# Patient Record
Sex: MALE | Race: WHITE
[De-identification: names, ages, dates, MRNs, and addresses within clinical notes are randomized per-mention and may not be internally consistent; named-entity substitution may affect disease eponyms.]

---

## 2017-12-16 ENCOUNTER — HOSPITAL ENCOUNTER (EMERGENCY)
Dept: HOSPITAL 42 - ED | Age: 27
Discharge: HOME | End: 2017-12-16
Payer: MEDICAID

## 2017-12-16 VITALS — SYSTOLIC BLOOD PRESSURE: 136 MMHG | TEMPERATURE: 97.6 F | DIASTOLIC BLOOD PRESSURE: 84 MMHG | RESPIRATION RATE: 20 BRPM

## 2017-12-16 VITALS — HEART RATE: 80 BPM | OXYGEN SATURATION: 100 %

## 2017-12-16 VITALS — BODY MASS INDEX: 33.2 KG/M2

## 2017-12-16 DIAGNOSIS — R05: Primary | ICD-10-CM

## 2017-12-16 DIAGNOSIS — R09.81: ICD-10-CM

## 2017-12-16 NOTE — ED PDOC
Arrival/HPI





- General


Time Seen by Provider: 12/16/17 15:45


Historian: Patient





- History of Present Illness


Narrative History of Present Illness (Text): 





12/16/17 15:56


27yr old male with hx of smoking presents today with 2 week history of cough 

with yellow sputum. c/o nasal congestion. no cp or sob. states he has had 

hoarse voice for over a year and has f/u with specialist and had scope. pt 

denies fever/chills. denies sick contacts. pt denies sore throat. denies ear 

pain. no vomiting/diarrhea. no abdominal pain. c/o rib pain with cough. no 

headache or dizziness. no other complaints. 


Time/Duration: > week (2 weeks)


Symptom Onset: Gradual





Past Medical History





- Provider Review


Nursing Documentation Reviewed: Yes





- Travel History


Have you recently traveled outside [place] w/in the past 3 mons?: No





- Infectious Disease


Hx of Infectious Diseases: None





- Tetanus Immunization


Tetanus Immunization: Unknown





- Neurological


Hx Seizures: Yes





- Psychiatric


Hx Depression: No


Hx Emotional Abuse: No


Hx Physical Abuse: No


Hx Substance Use: No





- Suicidal Assessment


Feels Threatened In Home Enviroment: No





Family/Social History





- Physician Review


Nursing Documentation Reviewed: Yes


Family/Social History: Unknown Family HX


Smoking Status: Heavy Smoker > 10 Cigarettes Daily


Hx Alcohol Use: No


Hx Substance Use: No


Hx Substance Use Treatment: No





Allergies/Home Meds


Allergies/Adverse Reactions: 


Allergies





No Known Allergies Allergy (Verified 03/12/13 17:30)


 











Review of Systems





- Review of Systems


Constitutional: absent: Fatigue, Fevers


ENT: Sinus Congestion.  absent: Sore Throat


Respiratory: Cough, Sputum.  absent: SOB


Cardiovascular: absent: Chest Pain, Palpitations


Gastrointestinal: absent: Abdominal Pain, Nausea, Vomiting


Genitourinary Male: absent: Dysuria


Musculoskeletal: absent: Arthralgias


Skin: absent: Rash, Pruritis


Neurological: absent: Headache, Dizziness


Psychiatric: absent: Anxiety, Depression, Suicidal Ideation





Physical Exam


Vital Signs Reviewed: Yes


Vital Signs











  Temp Pulse Resp BP Pulse Ox


 


 12/16/17 15:42  97.6 F  83  20  136/84  97











Temperature: Afebrile


Blood Pressure: Normal


Pulse: Regular


Respiratory Rate: Normal


Appearance: Positive for: Well-Appearing, Non-Toxic, Comfortable


Pain Distress: None


Mental Status: Positive for: Alert and Oriented X 3





- Systems Exam


Head: Present: Atraumatic


Conjunctiva: Present: Normal


Ears: Present: Normal, NORMAL TM


Mouth: Present: Moist Mucous Membranes


Pharnyx: Present: Normal, Other (hoarse voice (chronic per patient)).  No: 

ERYTHEMA, EXUDATE, TONSILS ENLARGED, Peritonsilar Swelling, Uvular Deviation


Nose (External): Present: Atraumatic


Nose (Internal): Present: Normal Inspection


Neck: Present: Normal Range of Motion, Trachea Midline.  No: Lymphadenopathy


Respiratory/Chest: Present: Clear to Auscultation, Good Air Exchange.  No: 

Respiratory Distress, Accessory Muscle Use, Wheezes, Decreased Breath Sounds, 

Retracting, Rhonchi, Tachypneic


Cardiovascular: Present: Regular Rate and Rhythm


Abdomen: No: Tenderness, Distention, Rebound, Guarding


Back: Present: Normal Inspection


Neurological: Present: GCS=15, Speech Normal


Skin: Present: Warm, Dry


Psychiatric: Present: Alert, Oriented x 3





Medical Decision Making


ED Course and Treatment: 





12/16/17 15:59


Patient is nontoxic well-appearing in no distress. Vital signs are stable. 





cxr; FINDINGS:


LUNGS:


No active pulmonary disease.


PLEURA:


No significant pleural effusion identified. No pneumothorax apparent.


CARDIOVASCULAR:


Normal.


OSSEOUS STRUCTURES:


No significant abnormalities.


VISUALIZED UPPER ABDOMEN:


Normal.


OTHER FINDINGS:


None.


IMPRESSION:


No active disease.








Zithromax





pt reassessment; pt non toxic well appearing; no distress. stable vitals. 





I advised follow up with primary care physician within the next 2 days. I 

advised increase fluids and return if symptoms worsen persist or if new 

symptoms develop.








IMPRESSION; cough


Motrin one tablet every 6 hours as needed for pain


Zithromax one tablet once daily x4 days


FLonase; 2 sprays each nostril once daily. 


Increase fluids


Followup with primary care physician the next 2 days


Follow up with the ENT specialist within the next 2 days. 


Return if symptoms worsen persist or if new symptoms develop











- RAD Interpretation


Radiology Orders: 








12/16/17 15:55


CHEST TWO VIEWS (PA/LAT) [RAD] Stat 














Disposition/Present on Arrival





- Present on Arrival


Any Indicators Present on Arrival: No


History of DVT/PE: No


History of Uncontrolled Diabetes: No


Urinary Catheter: No


History of Decub. Ulcer: No


History Surgical Site Infection Following: None





- Disposition


Have Diagnosis and Disposition been Completed?: Yes


Diagnosis: 


 Cough, Nasal congestion





Disposition: HOME/ ROUTINE


Disposition Time: 16:31


Patient Plan: Discharge


Patient Problems: 


 Current Active Problems











Problem Status Onset


 


Cough Acute  


 


Nasal congestion Acute  











Condition: GOOD


Discharge Instructions (ExitCare):  Acute Cough (ED)


Additional Instructions: 


Motrin every 6 hours as needed for pain


Zithromax one tablet once daily x4 days


FLonase; 2 sprays each nostril once daily. 


Increase fluids


Followup with primary care physician the next 2 days


Follow up with the ENT specialist within the next 2 days. 


Return if symptoms worsen persist or if new symptoms develop


Prescriptions: 


Azithromycin [Zithromax] 250 mg PO DAILY #4 tab


Fluticasone Nasal [Flonase] 2 spr NS DAILY #1 spr


Referrals: 


Isidro Rivera MD [Primary Care Provider] - Follow up with primary


Abe Kim DO [Staff Provider] - Follow up with primary


Forms:  WORK NOTE

## 2017-12-16 NOTE — RAD
HISTORY:

cough x 2 weeks  



COMPARISON:

No prior.



TECHNIQUE:

Chest PA and lateral



FINDINGS:



LUNGS:

No active pulmonary disease.



PLEURA:

No significant pleural effusion identified. No pneumothorax apparent.



CARDIOVASCULAR:

Normal.



OSSEOUS STRUCTURES:

No significant abnormalities.



VISUALIZED UPPER ABDOMEN:

Normal.



OTHER FINDINGS:

None.



IMPRESSION:

No active disease.

## 2018-02-10 ENCOUNTER — HOSPITAL ENCOUNTER (EMERGENCY)
Dept: HOSPITAL 42 - ED | Age: 28
Discharge: HOME | End: 2018-02-10
Payer: MEDICAID

## 2018-02-10 VITALS — BODY MASS INDEX: 33.2 KG/M2

## 2018-02-10 DIAGNOSIS — H60.92: Primary | ICD-10-CM

## 2018-02-10 NOTE — ED PDOC
Arrival/HPI





- General


Time Seen by Provider: 02/10/18 17:19


Historian: Patient





- History of Present Illness


Narrative History of Present Illness (Text): 





02/10/18 17:47


26yo male with no PMhx present with 2weeks history of left ear pain. States 

pain started after cleaning his ear two weeks ago. He did not take any 

medication for the pain. Denies drainage from the ear, hearing loss, any other 

complaint.





Past Medical History





- Provider Review


Nursing Documentation Reviewed: Yes





- Infectious Disease


Hx of Infectious Diseases: None





- Tetanus Immunization


Tetanus Immunization: Unknown





- Neurological


Hx Seizures: Yes





- Psychiatric


Hx Depression: No


Hx Emotional Abuse: No


Hx Physical Abuse: No


Hx Substance Use: No





- Anesthesia


Hx Anesthesia: No


Hx Anesthesia Reactions: No


Hx Malignant Hyperthermia: No





- Suicidal Assessment


Feels Threatened In Home Enviroment: No





Family/Social History





- Physician Review


Nursing Documentation Reviewed: Yes


Family/Social History: Unknown Family HX


Smoking Status: Heavy Smoker > 10 Cigarettes Daily


Hx Alcohol Use: No


Hx Substance Use: No


Hx Substance Use Treatment: No





Allergies/Home Meds


Allergies/Adverse Reactions: 


Allergies





No Known Allergies Allergy (Verified 03/12/13 17:30)


 











Review of Systems





- Physician Review


All systems were reviewed & negative as marked: Yes





- Review of Systems


Constitutional: Normal


Eyes: Normal


ENT: Other (LEft ear pain)


Respiratory: Normal


Cardiovascular: Normal


Gastrointestinal: Normal


Genitourinary Male: Normal


Musculoskeletal: Normal


Skin: Normal


Neurological: Normal


Endocrine: Normal


Hemo/Lymphatic: Normal


Psychiatric: Normal





Physical Exam


Vital Signs Reviewed: Yes


Temperature: Afebrile


Blood Pressure: Normal


Pulse: Regular


Respiratory Rate: Normal


Appearance: Positive for: Well-Appearing, Non-Toxic, Comfortable


Pain Distress: None


Mental Status: Positive for: Alert and Oriented X 3





- Systems Exam


Head: Present: Atraumatic, Normocephalic


Pupils: Present: PERRL


Extroacular Muscles: Present: EOMI


Conjunctiva: Present: Normal


Ears: Present: NORMAL TM, Normal Canal, Other (Tenderness posterior to left ear 

and pain with manipulation of left pinna).  No: Erythema, TM Bulging, Fluid, TM 

Perf


Mouth: Present: Moist Mucous Membranes


Neck: Present: Normal Range of Motion


Respiratory/Chest: Present: Clear to Auscultation, Good Air Exchange.  No: 

Respiratory Distress, Accessory Muscle Use


Cardiovascular: Present: Regular Rate and Rhythm, Normal S1, S2.  No: Murmurs


Abdomen: Present: Normal Bowel Sounds.  No: Tenderness, Distention, Peritoneal 

Signs


Back: Present: Normal Inspection


Upper Extremity: Present: Normal Inspection.  No: Cyanosis, Edema


Lower Extremity: Present: Normal Inspection.  No: Edema


Neurological: Present: GCS=15, CN II-XII Intact, Speech Normal


Skin: Present: Warm, Dry, Normal Color.  No: Rashes


Psychiatric: Present: Alert, Oriented x 3, Normal Insight, Normal Concentration





Medical Decision Making





- Medication Orders


Current Medication Orders: 














Discontinued Medications





Ciprofloxacin/Dexamethasone (Ciprodex Otic)  4 drop AD ONCE STA


   Stop: 02/10/18 17:20


Ibuprofen (Motrin Tab)  600 mg PO STAT STA


   Stop: 02/10/18 17:21











Disposition/Present on Arrival





- Present on Arrival


Any Indicators Present on Arrival: No


History of DVT/PE: No


History of Uncontrolled Diabetes: No


Urinary Catheter: No


History Surgical Site Infection Following: None





- Disposition


Have Diagnosis and Disposition been Completed?: Yes


Diagnosis: 


 Acute otitis externa





Disposition: HOME/ ROUTINE


Disposition Time: 17:55


Patient Plan: Discharge


Condition: STABLE


Discharge Instructions (ExitCare):  Otitis Externa (ED)


Additional Instructions: 


Follow up with your doctor/ENT


Return to ED for any new or worsening symptoms


Prescriptions: 


Ciprofloxacin/Dexamethasone [Ciprodex 0.3%-0.1% 7.5 Ml] 7.5 ml OT BID #1 bottle


Ibuprofen [Motrin Tab] 600 mg PO Q6 #20 tab


Referrals: 


Kirk Esposito DO [[facility]] - Follow up with primary

## 2019-04-27 ENCOUNTER — HOSPITAL ENCOUNTER (EMERGENCY)
Dept: HOSPITAL 42 - ED | Age: 29
Discharge: HOME | End: 2019-04-27
Payer: SELF-PAY

## 2019-04-27 VITALS — SYSTOLIC BLOOD PRESSURE: 106 MMHG | TEMPERATURE: 97.9 F | DIASTOLIC BLOOD PRESSURE: 69 MMHG | HEART RATE: 75 BPM

## 2019-04-27 VITALS — OXYGEN SATURATION: 99 % | RESPIRATION RATE: 18 BRPM

## 2019-04-27 VITALS — BODY MASS INDEX: 33.2 KG/M2

## 2019-04-27 DIAGNOSIS — M54.5: Primary | ICD-10-CM

## 2019-04-27 PROCEDURE — 99283 EMERGENCY DEPT VISIT LOW MDM: CPT

## 2019-04-27 PROCEDURE — 96372 THER/PROPH/DIAG INJ SC/IM: CPT

## 2019-04-27 PROCEDURE — 72110 X-RAY EXAM L-2 SPINE 4/>VWS: CPT

## 2019-04-27 NOTE — ED PDOC
Arrival/HPI





- General


Historian: Patient





- History of Present Illness


Narrative History of Present Illness (Text): 





04/27/19 12:32


28-year-old male presents today with a one-week history of left lower back pain 

radiating into the buttocks and left leg.  Patient denies any recent trauma or 

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  

Patient states the pain is sharp and achy located in the left lower back and 

radiates into the left leg.  Patient denies numbness weakness or tingling in the

extremity.  He denies saddle paresthesias.  He denies bladder or bowel 

incontinence.  No abdominal pain.  No nausea vomiting diarrhea or constipation. 

No chest pain or shortness of breath.  Patient states he is been taking Motrin 

and Tylenol without improvement in his symptoms.  Patient states symptoms worsen

when standing for long period of time or lifting his leg. 





<Sarah Benitez - Last Filed: 04/27/19 16:57>





<Amaury Hobbs - Last Filed: 04/27/19 17:01>





- General


Chief Complaint: Back Pain


Time Seen by Provider: 04/27/19 12:10





Past Medical History





- Provider Review


Nursing Documentation Reviewed: Yes





- Travel History


Have you recently traveled outside [place] w/in the past 3 mons?: No





- Infectious Disease


Hx of Infectious Diseases: None





- Tetanus Immunization


Tetanus Immunization: Unknown





- Neurological


Hx Seizures: Yes





- Psychiatric


Hx Depression: No


Hx Emotional Abuse: No


Hx Physical Abuse: No


Hx Substance Use: No





- Anesthesia


Hx Anesthesia: No


Hx Anesthesia Reactions: No


Hx Malignant Hyperthermia: No





- Suicidal Assessment


Feels Threatened In Home Enviroment: No





<Sarah Benitez - Last Filed: 04/27/19 16:57>





Family/Social History





- Physician Review


Nursing Documentation Reviewed: Yes


Family/Social History: Unknown Family HX


Smoking Status: Heavy Smoker > 10 Cigarettes Daily


Hx Alcohol Use: No


Hx Substance Use: No


Hx Substance Use Treatment: No





<Sarah Benitez - Last Filed: 04/27/19 16:57>





Allergies/Home Meds





<Sarah Benitez - Last Filed: 04/27/19 16:57>





<Amaury Hobbs - Last Filed: 04/27/19 17:01>


Allergies/Adverse Reactions: 


Allergies





No Known Allergies Allergy (Verified 02/10/18 17:55)


   











Review of Systems





- Review of Systems


Constitutional: absent: Fatigue, Fevers


Respiratory: absent: SOB, Cough


Cardiovascular: absent: Chest Pain, Palpitations


Gastrointestinal: absent: Abdominal Pain, Nausea, Vomiting


Musculoskeletal: Arthralgias, Back Pain.  absent: Neck Pain


Skin: absent: Rash, Pruritis


Neurological: absent: Headache, Dizziness


Psychiatric: absent: Anxiety, Depression





<Sarah Benitez T - Last Filed: 04/27/19 16:57>





Physical Exam


Vital Signs Reviewed: Yes





Vital Signs











  Temp Pulse Resp BP Pulse Ox


 


 04/27/19 12:03  97.7 F  88  18  121/67  99


 


 04/27/19 11:55  97.7 F  88  18  121/67  99











Temperature: Afebrile


Blood Pressure: Normal


Pulse: Regular


Respiratory Rate: Normal


Appearance: Positive for: Well-Appearing, Non-Toxic, Comfortable


Pain Distress: None


Mental Status: Positive for: Alert and Oriented X 3





- Systems Exam


Head: Present: Atraumatic


Neck: Present: Normal Range of Motion


Respiratory/Chest: Present: Clear to Auscultation, Good Air Exchange.  No: 

Respiratory Distress, Accessory Muscle Use


Cardiovascular: Present: Regular Rate and Rhythm, Normal S1, S2.  No: Murmurs


Abdomen: No: Tenderness, Distention, Rebound, Guarding


Back: Present: Normal Inspection, Paraspinal Tenderness (+ left sided paraspinal

tenderness, + left sided sciatic foramen tenderness), Pain with Leg Raise (+ 

straight leg raise on the left.).  No: CVA Tenderness, Midline Tenderness


Upper Extremity: Present: Normal Inspection


Lower Extremity: Present: Normal Inspection, Normal ROM


Neurological: Present: GCS=15, Motor Func Grossly Intact, Normal Sensory 

Function, Gait Normal


Skin: Present: Warm, Dry, Normal Color.  No: Rashes


Psychiatric: Present: Alert, Oriented x 3





<Sarah Benitez T - Last Filed: 04/27/19 16:57>





Vital Signs











  Temp Pulse Resp BP Pulse Ox


 


 04/27/19 13:42  97.9 F  75  18  106/69  99


 


 04/27/19 12:03  97.7 F  88  18  121/67  99


 


 04/27/19 11:55  97.7 F  88  18  121/67  99














<Amaury Hobbs - Last Filed: 04/27/19 17:01>





Medical Decision Making


ED Course and Treatment: 





04/27/19 13:22


Patient nontoxic well-appearing in no distress with stable vital signs. 











Toradol, Flexeril, x-ray of the lumbar spine: No fracture





Patient reassessment: Feeling better with medications ambulating with a steady 

gait. Muscle strength 5 out of 5 bilaterally.








I advised to followup with the orthopedist/back specialist within the next 2 

days. Return if symptoms worsen persist or new symptoms develop. 





Patient verbalizes understanding of discharge instructions and need for 

immediate followup.








All aspects of this case were discussed the attending of record.








Impression: Back pain 


Motrin every 6 hours as needed for pain


Flexeril one tablet every 8 hours as needed for muscle spasms: May cause 

drowsiness


Followup with the orthopedist/back specialist within the next 2 days 


Followup with primary care physician within the next 2 days


Return if symptoms worsen persist or if new symptoms develop





Reassessment Condition: Re-examined, Improved





- RAD Interpretation


Radiology Orders: 











04/27/19 12:28


LS SPINE WITH OBL > 18 YRS OLD [RAD] Stat 














- Medication Orders


Current Medication Orders: 














Discontinued Medications





Cyclobenzaprine HCl (Flexeril)  10 mg PO STAT STA


   Stop: 04/27/19 12:29


Ketorolac Tromethamine (Toradol)  60 mg IM STAT STA


   Stop: 04/27/19 12:29











<Sarah Benitez - Last Filed: 04/27/19 16:57>





- RAD Interpretation


Radiology Orders: 











04/27/19 12:28


LS SPINE WITH OBL > 18 YRS OLD [RAD] Stat 














- Medication Orders


Current Medication Orders: 














Discontinued Medications





Cyclobenzaprine HCl (Flexeril)  10 mg PO STAT STA


   Stop: 04/27/19 12:29


   Last Admin: 04/27/19 12:41  Dose: 10 mg





Ketorolac Tromethamine (Toradol)  60 mg IM STAT STA


   Stop: 04/27/19 12:29


   Last Admin: 04/27/19 12:41  Dose: 60 mg





MAR Pain Assessment


 Document     04/27/19 12:41  SRE  (Rec: 04/27/19 12:42  SRE  [id number])


     Pain Reassessment


      Is this a pain reassessment?               Yes


     Sleep


      Is patient sleeping during reassessment?   No


     Presence of Pain


      Presence of Pain                           Yes


     Pain Scale Used


     Protocol:  PSCALES


      Pain Scale Used                            Numeric


     Location


      Pain Location Body Site                    Back


     Description


      Description                                Intermittent


IM Administration Charges


 Document     04/27/19 12:41  SRE  (Rec: 04/27/19 12:42  SRE  [id number])


     Charges for Administration


      # of IM Administrations                    1














<Amaury Hobbs - Last Filed: 04/27/19 17:01>





- PA / NP / Resident Statement


MD/ has reviewed & agrees with the documentation as recorded.





<Amaury Hobbs - Last Filed: 04/27/19 17:01>





Disposition/Present on Arrival





- Present on Arrival


Any Indicators Present on Arrival: No


History of DVT/PE: No


History of Uncontrolled Diabetes: No


Urinary Catheter: No


History of Decub. Ulcer: No


History Surgical Site Infection Following: None





- Disposition


Have Diagnosis and Disposition been Completed?: Yes


Disposition Time: 13:23


Patient Plan: Discharge





<Sarah Benitez - Last Filed: 04/27/19 16:57>





<Amaury Hobbs - Last Filed: 04/27/19 17:01>





- Disposition


Diagnosis: 


 Back pain





Disposition: HOME/ ROUTINE


Condition: GOOD


Discharge Instructions (ExitCare):  Low Back Pain  (DC)


Additional Instructions: 


Motrin every 6 hours as needed for pain


Flexeril one tablet every 8 hours as needed for muscle spasms: May cause 

drowsiness


Followup with the orthopedist/back specialist within the next 2 days 


Followup with primary care physician within the next 2 days


Return if symptoms worsen persist or if new symptoms develop





Prescriptions: 


Cyclobenzaprine [Cyclobenzaprine HCl] 10 mg PO Q8 #10 tab


Ibuprofen [Motrin] 600 mg PO Q6H PRN #20 tab


 PRN Reason: pain/fever reduction


Referrals: 


Jerry Bryant MD [Staff Provider] - Follow up with primary


Kalpana Sam MD [Medical Doctor] - Follow up with primary


[facility] [Outside] - Follow up with primary


[facility]  [Outside[facility] - Follow up with primary


[facility] [place] [Outside] - Follow up with primary


Rojas Cruz DO [Staff Provider] - Follow up with primary


Forms:  [facility] (English), WORK NOTE

## 2019-04-27 NOTE — RAD
Date of service: 



04/27/2019



PROCEDURE:  Radiographs of the Lumbar Spine.



HISTORY:

back pain







COMPARISON:

No prior.



TECHNIQUE:

 5 views obtained.



FINDINGS:



BONES:

Normal alignment. No listhesis. No fracture.



DISC SPACES:

Unremarkable.



OTHER FINDINGS:

None.



IMPRESSION:

Unremarkable radiographs of the lumbar spine.